# Patient Record
Sex: MALE | Race: BLACK OR AFRICAN AMERICAN | NOT HISPANIC OR LATINO | Employment: FULL TIME | URBAN - METROPOLITAN AREA
[De-identification: names, ages, dates, MRNs, and addresses within clinical notes are randomized per-mention and may not be internally consistent; named-entity substitution may affect disease eponyms.]

---

## 2023-06-30 ENCOUNTER — OFFICE VISIT (OUTPATIENT)
Dept: PRIMARY CARE | Facility: CLINIC | Age: 63
End: 2023-06-30
Payer: COMMERCIAL

## 2023-06-30 VITALS — DIASTOLIC BLOOD PRESSURE: 71 MMHG | SYSTOLIC BLOOD PRESSURE: 111 MMHG | WEIGHT: 148 LBS

## 2023-06-30 DIAGNOSIS — K75.9 HEPATITIS: ICD-10-CM

## 2023-06-30 DIAGNOSIS — M18.11 OSTEOARTHRITIS OF RIGHT THUMB: ICD-10-CM

## 2023-06-30 DIAGNOSIS — M79.89 LEG SWELLING: ICD-10-CM

## 2023-06-30 DIAGNOSIS — M10.9 ACUTE GOUT OF LEFT FOOT, UNSPECIFIED CAUSE: ICD-10-CM

## 2023-06-30 DIAGNOSIS — Z00.00 HEALTH CARE MAINTENANCE: Primary | ICD-10-CM

## 2023-06-30 PROCEDURE — 99204 OFFICE O/P NEW MOD 45 MIN: CPT | Performed by: INTERNAL MEDICINE

## 2023-06-30 NOTE — PROGRESS NOTES
Subjective   Patient ID: Mary Jane Cook is a 63 y.o. male who presents for No chief complaint on file..    HPI met patient for first time see updated front sheet has not had follow-up in many years developed some leg swelling on the left 2 weeks ago after indulgent food had no prior history of gout no chest pain no shortness of breath bowels normal no dysuria bones muscles joints otherwise okay    Past medical history some type of hepatitis in the [facility] in the 1980s had yellow jaundice does not know much else    Medications none    Allergies no known drug allergies    Social history no tobacco    Family history noted less remarkable    Prevention some exercise mostly at work no recent blood work for review discussed with vaccinations discussed with colonoscopy discussed with prostate screening    Review of Systems    Objective   There were no vitals taken for this visit.    Physical Exam vital signs noted alert and oriented x3 NCAT NO ICTERUS NO JAUNDICE NO PALLOR PERRLA EOMI nares without discharge OP benign TM normal bilateral EAC clear bilateral no AC nodes no JVD or bruit no thyromegaly chest clear to auscultation and percussion CV regular rate and rhythm S1-S2 without murmur gallop or rub extremities no clubbing cyanosis or edema on the right on the left there is some dorsal foot swelling slight warmth no erythema full range of motion at the ankle normal Achilles normal gastroc musculoskeletal right thumb with significant DJD at the MCP    Assessment/Plan impression likely gout osteoarthritis posttraumatic right thumb leg swelling history of hepatitis  Plan with the unilateral leg swelling after eating meal rich in purines likely to be gout although never officially diagnosed with this in the past however has not had any visits in the past 10 years and has not had any blood work we will obtain Chem-7 advised on glucose potassium and kidney function as there is a family history of blood sugar check CBC  advised on blood count check lipid panel advised on cholesterol profile check uric acid advised on gout and treatment he will otherwise use foot soaks and may use analgesic medicine in the meantime check PSA advised on prostate check hepatic panel hepatitis C antibody hepatitis B surface antibody he is uncertain about vaccination history and in a much more detail on his previous hepatitis then may recheck for regular physical examination and screening as for the thumb may use Tylenol liniment cream range of motion and follow-up based on above TT 50 cc 26

## 2023-07-05 ENCOUNTER — LAB (OUTPATIENT)
Dept: LAB | Facility: LAB | Age: 63
End: 2023-07-05
Payer: COMMERCIAL

## 2023-07-05 DIAGNOSIS — Z00.00 HEALTH CARE MAINTENANCE: ICD-10-CM

## 2023-07-05 DIAGNOSIS — M18.11 OSTEOARTHRITIS OF RIGHT THUMB: ICD-10-CM

## 2023-07-05 DIAGNOSIS — M79.89 LEG SWELLING: ICD-10-CM

## 2023-07-05 DIAGNOSIS — M10.9 ACUTE GOUT OF LEFT FOOT, UNSPECIFIED CAUSE: ICD-10-CM

## 2023-07-05 DIAGNOSIS — K75.9 HEPATITIS: ICD-10-CM

## 2023-07-05 LAB
ALANINE AMINOTRANSFERASE (SGPT) (U/L) IN SER/PLAS: 19 U/L (ref 10–52)
ALBUMIN (G/DL) IN SER/PLAS: 4.3 G/DL (ref 3.4–5)
ALKALINE PHOSPHATASE (U/L) IN SER/PLAS: 96 U/L (ref 33–136)
ANION GAP IN SER/PLAS: 10 MMOL/L (ref 10–20)
ASPARTATE AMINOTRANSFERASE (SGOT) (U/L) IN SER/PLAS: 17 U/L (ref 9–39)
BILIRUBIN TOTAL (MG/DL) IN SER/PLAS: 0.4 MG/DL (ref 0–1.2)
CALCIUM (MG/DL) IN SER/PLAS: 9.7 MG/DL (ref 8.6–10.6)
CARBON DIOXIDE, TOTAL (MMOL/L) IN SER/PLAS: 26 MMOL/L (ref 21–32)
CHLORIDE (MMOL/L) IN SER/PLAS: 109 MMOL/L (ref 98–107)
CHOLESTEROL (MG/DL) IN SER/PLAS: 164 MG/DL (ref 0–199)
CHOLESTEROL IN HDL (MG/DL) IN SER/PLAS: 34.7 MG/DL
CHOLESTEROL/HDL RATIO: 4.7
CREATININE (MG/DL) IN SER/PLAS: 0.89 MG/DL (ref 0.5–1.3)
ERYTHROCYTE DISTRIBUTION WIDTH (RATIO) BY AUTOMATED COUNT: 12.4 % (ref 11.5–14.5)
ERYTHROCYTE MEAN CORPUSCULAR HEMOGLOBIN CONCENTRATION (G/DL) BY AUTOMATED: 32.7 G/DL (ref 32–36)
ERYTHROCYTE MEAN CORPUSCULAR VOLUME (FL) BY AUTOMATED COUNT: 93 FL (ref 80–100)
ERYTHROCYTES (10*6/UL) IN BLOOD BY AUTOMATED COUNT: 4.24 X10E12/L (ref 4.5–5.9)
GFR MALE: >90 ML/MIN/1.73M2
GLUCOSE (MG/DL) IN SER/PLAS: 89 MG/DL (ref 74–99)
HEMATOCRIT (%) IN BLOOD BY AUTOMATED COUNT: 39.5 % (ref 41–52)
HEMOGLOBIN (G/DL) IN BLOOD: 12.9 G/DL (ref 13.5–17.5)
HEPATITIS B VIRUS SURFACE AB (MIU/ML) IN SERUM: <3.1 MIU/ML
HEPATITIS C VIRUS AB PRESENCE IN SERUM: NONREACTIVE
LDL: 111 MG/DL (ref 0–99)
LEUKOCYTES (10*3/UL) IN BLOOD BY AUTOMATED COUNT: 5.6 X10E9/L (ref 4.4–11.3)
NRBC (PER 100 WBCS) BY AUTOMATED COUNT: 0 /100 WBC (ref 0–0)
PLATELETS (10*3/UL) IN BLOOD AUTOMATED COUNT: 317 X10E9/L (ref 150–450)
POTASSIUM (MMOL/L) IN SER/PLAS: 4.1 MMOL/L (ref 3.5–5.3)
PROSTATE SPECIFIC ANTIGEN,SCREEN: 1.89 NG/ML (ref 0–4)
PROTEIN TOTAL: 7 G/DL (ref 6.4–8.2)
SODIUM (MMOL/L) IN SER/PLAS: 141 MMOL/L (ref 136–145)
TRIGLYCERIDE (MG/DL) IN SER/PLAS: 94 MG/DL (ref 0–149)
UREA NITROGEN (MG/DL) IN SER/PLAS: 13 MG/DL (ref 6–23)
VLDL: 19 MG/DL (ref 0–40)

## 2023-07-05 PROCEDURE — 85027 COMPLETE CBC AUTOMATED: CPT

## 2023-07-05 PROCEDURE — 36415 COLL VENOUS BLD VENIPUNCTURE: CPT

## 2023-07-05 PROCEDURE — 84153 ASSAY OF PSA TOTAL: CPT

## 2023-07-05 PROCEDURE — 80053 COMPREHEN METABOLIC PANEL: CPT

## 2023-07-05 PROCEDURE — 86706 HEP B SURFACE ANTIBODY: CPT

## 2023-07-05 PROCEDURE — 80061 LIPID PANEL: CPT

## 2023-07-05 PROCEDURE — 86803 HEPATITIS C AB TEST: CPT
